# Patient Record
Sex: FEMALE | Race: WHITE
[De-identification: names, ages, dates, MRNs, and addresses within clinical notes are randomized per-mention and may not be internally consistent; named-entity substitution may affect disease eponyms.]

---

## 2017-05-12 ENCOUNTER — HOSPITAL ENCOUNTER (EMERGENCY)
Dept: HOSPITAL 14 - H.ER | Age: 52
Discharge: HOME | End: 2017-05-12
Payer: MEDICAID

## 2017-05-12 VITALS — SYSTOLIC BLOOD PRESSURE: 129 MMHG | DIASTOLIC BLOOD PRESSURE: 86 MMHG | TEMPERATURE: 98.1 F | HEART RATE: 69 BPM

## 2017-05-12 VITALS — BODY MASS INDEX: 32.4 KG/M2

## 2017-05-12 VITALS — OXYGEN SATURATION: 100 % | RESPIRATION RATE: 20 BRPM

## 2017-05-12 DIAGNOSIS — R07.81: Primary | ICD-10-CM

## 2017-05-12 DIAGNOSIS — R05: ICD-10-CM

## 2017-05-12 DIAGNOSIS — I10: ICD-10-CM

## 2017-05-12 LAB
BACTERIA #/AREA URNS HPF: (no result) /[HPF]
BILIRUB UR-MCNC: NEGATIVE MG/DL
COLOR UR: YELLOW
GLUCOSE UR STRIP-MCNC: (no result) MG/DL
KETONES UR STRIP-MCNC: NEGATIVE MG/DL
LEUKOCYTE ESTERASE UR-ACNC: (no result) LEU/UL
PH UR STRIP: 5 [PH] (ref 5–8)
PROT UR STRIP-MCNC: NEGATIVE MG/DL
RBC # UR STRIP: NEGATIVE /UL
RBC #/AREA URNS HPF: 5 /HPF (ref 0–3)
SP GR UR STRIP: 1.01 (ref 1–1.03)
UROBILINOGEN UR-MCNC: (no result) MG/DL (ref 0.2–1)
WBC #/AREA URNS HPF: < 1 /HPF (ref 0–5)

## 2018-02-13 ENCOUNTER — HOSPITAL ENCOUNTER (EMERGENCY)
Dept: HOSPITAL 14 - H.ER | Age: 53
Discharge: HOME | End: 2018-02-13
Payer: MEDICAID

## 2018-02-13 VITALS
TEMPERATURE: 99.9 F | HEART RATE: 82 BPM | SYSTOLIC BLOOD PRESSURE: 117 MMHG | DIASTOLIC BLOOD PRESSURE: 79 MMHG | RESPIRATION RATE: 18 BRPM | OXYGEN SATURATION: 100 %

## 2018-02-13 VITALS — BODY MASS INDEX: 32.4 KG/M2

## 2018-02-13 DIAGNOSIS — J11.1: Primary | ICD-10-CM

## 2018-02-13 DIAGNOSIS — I10: ICD-10-CM

## 2018-02-17 ENCOUNTER — HOSPITAL ENCOUNTER (EMERGENCY)
Dept: HOSPITAL 14 - H.ER | Age: 53
Discharge: HOME | End: 2018-02-17
Payer: MEDICAID

## 2018-02-17 VITALS
HEART RATE: 70 BPM | RESPIRATION RATE: 19 BRPM | SYSTOLIC BLOOD PRESSURE: 121 MMHG | OXYGEN SATURATION: 100 % | DIASTOLIC BLOOD PRESSURE: 77 MMHG | TEMPERATURE: 98.4 F

## 2018-02-17 VITALS — BODY MASS INDEX: 32.4 KG/M2

## 2018-02-17 DIAGNOSIS — M54.5: Primary | ICD-10-CM

## 2018-02-17 DIAGNOSIS — I10: ICD-10-CM

## 2018-02-17 PROCEDURE — 96372 THER/PROPH/DIAG INJ SC/IM: CPT

## 2018-02-17 PROCEDURE — 99282 EMERGENCY DEPT VISIT SF MDM: CPT

## 2018-06-25 ENCOUNTER — HOSPITAL ENCOUNTER (OUTPATIENT)
Dept: HOSPITAL 14 - H.OPSURG | Age: 53
Discharge: HOME | End: 2018-06-25
Attending: FAMILY MEDICINE
Payer: SELF-PAY

## 2018-06-25 VITALS
SYSTOLIC BLOOD PRESSURE: 143 MMHG | TEMPERATURE: 97.9 F | HEART RATE: 59 BPM | DIASTOLIC BLOOD PRESSURE: 93 MMHG | OXYGEN SATURATION: 99 %

## 2018-06-25 VITALS — BODY MASS INDEX: 30.9 KG/M2

## 2018-06-25 VITALS — RESPIRATION RATE: 20 BRPM

## 2018-06-25 DIAGNOSIS — E04.9: Primary | ICD-10-CM

## 2018-08-24 ENCOUNTER — HOSPITAL ENCOUNTER (EMERGENCY)
Dept: HOSPITAL 14 - H.ER | Age: 53
Discharge: HOME | End: 2018-08-24
Payer: SELF-PAY

## 2018-08-24 VITALS
DIASTOLIC BLOOD PRESSURE: 85 MMHG | HEART RATE: 62 BPM | SYSTOLIC BLOOD PRESSURE: 123 MMHG | RESPIRATION RATE: 16 BRPM | TEMPERATURE: 98.2 F | OXYGEN SATURATION: 96 %

## 2018-08-24 VITALS — BODY MASS INDEX: 30.9 KG/M2

## 2018-08-24 DIAGNOSIS — M54.32: Primary | ICD-10-CM

## 2018-08-24 DIAGNOSIS — I10: ICD-10-CM

## 2018-08-27 ENCOUNTER — HOSPITAL ENCOUNTER (EMERGENCY)
Dept: HOSPITAL 31 - C.ER | Age: 53
Discharge: HOME | End: 2018-08-27
Payer: MEDICAID

## 2018-08-27 VITALS — BODY MASS INDEX: 30.9 KG/M2

## 2018-08-27 VITALS — HEART RATE: 55 BPM | TEMPERATURE: 97.6 F | SYSTOLIC BLOOD PRESSURE: 125 MMHG | DIASTOLIC BLOOD PRESSURE: 86 MMHG

## 2018-08-27 VITALS — RESPIRATION RATE: 18 BRPM | OXYGEN SATURATION: 99 %

## 2018-08-27 DIAGNOSIS — M54.5: Primary | ICD-10-CM

## 2018-08-27 LAB
BILIRUB UR-MCNC: NEGATIVE MG/DL
GLUCOSE UR STRIP-MCNC: NORMAL MG/DL
LEUKOCYTE ESTERASE UR-ACNC: (no result) LEU/UL
PH UR STRIP: 5 [PH] (ref 5–8)
PROT UR STRIP-MCNC: NEGATIVE MG/DL
RBC # UR STRIP: (no result) /UL
SP GR UR STRIP: 1.02 (ref 1–1.03)
SQUAMOUS EPITHIAL: 1 /HPF (ref 0–5)
UROBILINOGEN UR-MCNC: NORMAL MG/DL (ref 0.2–1)

## 2018-08-27 PROCEDURE — 72100 X-RAY EXAM L-S SPINE 2/3 VWS: CPT

## 2018-08-27 PROCEDURE — 87086 URINE CULTURE/COLONY COUNT: CPT

## 2018-08-27 PROCEDURE — 96372 THER/PROPH/DIAG INJ SC/IM: CPT

## 2018-08-27 PROCEDURE — 81001 URINALYSIS AUTO W/SCOPE: CPT

## 2018-08-27 PROCEDURE — 99284 EMERGENCY DEPT VISIT MOD MDM: CPT

## 2018-08-27 NOTE — C.PDOC
History Of Present Illness


54 y/o female presents to ED with c/o left lower back pain for 1 week radiating 

to back of left leg and knee. Patient reports dysuria and fever for "several 

days" and denies taking pain medication, hematuria, vomiting, leg numbness or 

any other complaints at this time. 


Time Seen by Provider: 08/27/18 09:05


Chief Complaint (Nursing): Back Pain


History Per: Patient


History/Exam Limitations: no limitations


Onset/Duration Of Symptoms: Days


Current Symptoms Are (Timing): Still Present


Quality Of Discomfort: "Pain"





Past Medical History


Reviewed: Historical Data, Nursing Documentation, Vital Signs


Vital Signs: 


 Last Vital Signs











Temp  97.6 F   08/27/18 10:27


 


Pulse  55 L  08/27/18 10:27


 


Resp  18   08/27/18 10:27


 


BP  125/86   08/27/18 10:27


 


Pulse Ox  99   08/27/18 11:46














- Medical History


PMH: Gastritis, HTN


Surgical History: Endoscopy





- [facility] Procedures








ESOPHAGOGASTRODUODENOSCOPY [EGD] W/CLOSED BIOPSY (09/17/15)


INJECT/INFUSE NEC (03/06/15)








Family History: States: No Known Family Hx





- Social History


Hx Tobacco Use: No


Hx Alcohol Use: No


Hx Substance Use: No





Review Of Systems


Constitutional: Positive for: Fever.  Negative for: Chills


Gastrointestinal: Negative for: Vomiting


Genitourinary: Positive for: Dysuria.  Negative for: Hematuria, Vaginal 

Discharge, Vaginal Bleeding


Musculoskeletal: Positive for: Back Pain


Skin: Negative for: Rash





Physical Exam





- Physical Exam


Appears: Non-toxic, No Acute Distress


Skin: Warm, Dry, No Rash


Head: Atraumatic, Normacephalic


Eye(s): bilateral: Normal Inspection


Oral Mucosa: Moist


Gastrointestinal/Abdominal: Soft, No Tenderness, No Guarding, No Rebound


Back: No CVA Tenderness, Other (left lower back tenderness)


Neurological/Psych: Oriented x3, Normal Speech, Normal Motor, Normal Sensation





ED Course And Treatment


O2 Sat by Pulse Oximetry: 99 (RA)


Pulse Ox Interpretation: Normal





Disposition





- Disposition


Referrals: 


[facility] Freddie Rearuna, [Non-Staff] - 


Disposition: HOME/ ROUTINE


Disposition Time: 10:15


Condition: GOOD


Additional Instructions: 





MAYUR ZELAYA, thank you for letting us take care of you today. The 

emergency medical care you received today was directed at your acute symptoms. 

If you were prescribed any medication, please fill it and take as directed. It 

may take several days for your symptoms to resolve. Return to the Emergency 

Department if your symptoms worsen, do not improve, or if you have any other 

problems.





Please contact your doctor or call one of the physicians/clinics you have been 

referred to that are listed on the Patient Visit Information form that is 

included in your discharge packet. Bring any paperwork you were given at 

discharge with you along with any medications you are taking to your follow up 

visit. Our treatment cannot replace ongoing medical care by a primary care 

provider outside of the emergency department.





Thank you for allowing the [facility] team to be part of your care today.

















Follow up with your primary care doctor in 2-3 days for re-evaluation and 

further management.











MAYUR ZELAYA, marcellus por dejarnos atenderlo alin. La atencin mdica de 

emergencia que recibi hoy estaba dirigida a madisyn sntomas agudos. Si le 

prescribieron algn medicamento, llnelo y tome segn las indicaciones. Madisyn s

ntomas pueden tardar varios asher en resolverse. Regrese al Departamento de 

Emergencia si madisyn sntomas empeoran, no mejoran o si tiene algn otro problema.





Comunquese con nava mdico o llame a cristino de los mdicos / clnicas a los que ha 

sido referido que figura en el formulario de Informacin de visita del paciente 

que se incluye en nava paquete de priscilla. Traiga todos los documentos que recibi 

al momento del priscilla junto con los medicamentos que est tomando en nava visita de 

seguimiento. Nuestro tratamiento no puede reemplazar la atencin mdica en 

curso por un proveedor de atencin primaria fuera del departamento de 

emergencia.





Marcellus por permitir que el equipo de [facility] sea parte de nava cuidado 

hoy.

















Tiffanie un seguimiento con nava mdico de atencin primaria en 2-3 asher para zac 

nueva evaluacin y administracin adicional.


Prescriptions: 


Cyclobenzaprine [Cyclobenzaprine HCl] 10 mg PO Q8 PRN #20 tab


 PRN Reason: Muscle Spasm


Ibuprofen [Motrin] 600 mg PO Q6 PRN #20 tab


 PRN Reason: Pain, Moderate (4-7)


Instructions:  Sciatica, Low Back Pain  (DC)


Forms:  Gen Discharge Inst Divehi, [facility] (Divehi)


Print Language: Romanian





- Clinical Impression


Clinical Impression: 


 Low back pain








- Scribe Statement


The provider has reviewed the documentation as recorded by the Jarredibdwaine Jameson





All medical record entries made by the Jarredibdwaine were at my direction and 

personally dictated by me. I have reviewed the chart and agree that the record 

accurately reflects my personal performance of the history, physical exam, 

medical decision making, and the department course for this patient. I have 

also personally directed, reviewed, and agree with the discharge instructions 

and disposition.

## 2018-08-27 NOTE — RAD
Date of service: 08/27/2018



PROCEDURE:  Radiographs of the Lumbar Spine.



HISTORY:

r/o fx







COMPARISON:

No prior.



FINDINGS:



BONES:

Normal alignment. No listhesis. No fracture.



DISC SPACES:

Unremarkable.



OTHER FINDINGS:

None.



IMPRESSION:

Unremarkable radiographs of the lumbar spine.

## 2018-10-10 ENCOUNTER — HOSPITAL ENCOUNTER (EMERGENCY)
Dept: HOSPITAL 31 - C.ER | Age: 53
Discharge: HOME | End: 2018-10-10
Payer: SELF-PAY

## 2018-10-10 VITALS — SYSTOLIC BLOOD PRESSURE: 160 MMHG | TEMPERATURE: 97.8 F | DIASTOLIC BLOOD PRESSURE: 111 MMHG | HEART RATE: 57 BPM

## 2018-10-10 VITALS — OXYGEN SATURATION: 98 %

## 2018-10-10 VITALS — RESPIRATION RATE: 18 BRPM

## 2018-10-10 VITALS — BODY MASS INDEX: 30.9 KG/M2

## 2018-10-10 DIAGNOSIS — S96.911A: ICD-10-CM
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
## 2018-10-10 DIAGNOSIS — S93.601A: Primary | ICD-10-CM
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
## 2018-10-10 NOTE — C.PDOC
Time Seen by Provider: 10/10/18 07:23


Chief Complaint (Nursing): Lower Extremity Problem/Injury





Past Medical History


Vital Signs: 





                                Last Vital Signs











Temp  98.4 F   10/10/18 07:20


 


Pulse  60   10/10/18 07:20


 


Resp  18   10/10/18 07:20


 


BP      


 


Pulse Ox  98   10/10/18 07:20














- Medical History


PMH: Gastritis, HTN


   Denies: Chronic Kidney Disease


Surgical History: Endoscopy





- [facility] Procedures











ESOPHAGOGASTRODUODENOSCOPY [EGD] W/CLOSED BIOPSY (09/17/15)


INJECT/INFUSE NEC (03/06/15)








Family History: States: Unknown Family Hx





- Social History


Hx Tobacco Use: No


Hx Alcohol Use: No


Hx Substance Use: No





- Immunization History


Hx Tetanus Toxoid Vaccination: No


Hx Influenza Vaccination: No


Hx Pneumococcal Vaccination: No





ED Course And Treatment


O2 Sat by Pulse Oximetry: 98





Disposition


Counseled Patient/Family Regarding: Studies Performed, Diagnosis, Need For 

Followup





- Disposition


Referrals: 


Podiatry Clinic [Outside]


[facility] [place] [Outside]


[facility] [Outside]


Disposition: HOME/ ROUTINE


Disposition Time: 08:30


Condition: STABLE


Additional Instructions: 


Follow up at clinic for further evaluation


Prescriptions: 


Ibuprofen [Motrin Tab] 400 mg PO TID PRN #12 tab


 PRN Reason: Pain


Instructions:  Foot Sprain (DC)


Print Language: Telugu





- POA


Present On Arrival: None





- Clinical Impression


Clinical Impression: 


 Sprain and strain

## 2019-02-05 ENCOUNTER — HOSPITAL ENCOUNTER (EMERGENCY)
Dept: HOSPITAL 14 - H.ER | Age: 54
Discharge: HOME | End: 2019-02-05
Payer: SELF-PAY

## 2019-02-05 VITALS — BODY MASS INDEX: 30.9 KG/M2

## 2019-02-05 VITALS
RESPIRATION RATE: 19 BRPM | DIASTOLIC BLOOD PRESSURE: 90 MMHG | SYSTOLIC BLOOD PRESSURE: 145 MMHG | HEART RATE: 64 BPM | OXYGEN SATURATION: 100 % | TEMPERATURE: 99 F

## 2019-02-05 DIAGNOSIS — S16.1XXA: Primary | ICD-10-CM

## 2019-02-05 DIAGNOSIS — S39.012A: ICD-10-CM

## 2019-02-05 PROCEDURE — 96372 THER/PROPH/DIAG INJ SC/IM: CPT

## 2019-02-05 PROCEDURE — 99283 EMERGENCY DEPT VISIT LOW MDM: CPT

## 2019-04-19 ENCOUNTER — HOSPITAL ENCOUNTER (EMERGENCY)
Dept: HOSPITAL 14 - H.ER | Age: 54
Discharge: HOME | End: 2019-04-19
Payer: SELF-PAY

## 2019-04-19 VITALS
TEMPERATURE: 98.2 F | HEART RATE: 76 BPM | SYSTOLIC BLOOD PRESSURE: 130 MMHG | DIASTOLIC BLOOD PRESSURE: 60 MMHG | OXYGEN SATURATION: 100 %

## 2019-04-19 VITALS — RESPIRATION RATE: 17 BRPM

## 2019-04-19 VITALS — BODY MASS INDEX: 30.9 KG/M2

## 2019-04-19 DIAGNOSIS — K29.70: Primary | ICD-10-CM

## 2019-04-19 DIAGNOSIS — I10: ICD-10-CM

## 2019-04-19 DIAGNOSIS — Z79.899: ICD-10-CM

## 2019-04-19 LAB
ALBUMIN SERPL-MCNC: 4.3 G/DL (ref 3.5–5)
ALBUMIN/GLOB SERPL: 1.4 {RATIO} (ref 1–2.1)
ALT SERPL-CCNC: 35 U/L (ref 9–52)
AST SERPL-CCNC: 28 U/L (ref 14–36)
BASOPHILS # BLD AUTO: 0 K/UL (ref 0–0.2)
BASOPHILS NFR BLD: 0.6 % (ref 0–2)
BUN SERPL-MCNC: 20 MG/DL (ref 7–17)
CALCIUM SERPL-MCNC: 9.6 MG/DL (ref 8.4–10.2)
EOSINOPHIL # BLD AUTO: 0.2 K/UL (ref 0–0.7)
EOSINOPHIL NFR BLD: 3.1 % (ref 0–4)
ERYTHROCYTE [DISTWIDTH] IN BLOOD BY AUTOMATED COUNT: 12.9 % (ref 11.5–14.5)
GFR NON-AFRICAN AMERICAN: > 60
HGB BLD-MCNC: 13.9 G/DL (ref 12–16)
LYMPHOCYTES # BLD AUTO: 2.8 K/UL (ref 1–4.3)
LYMPHOCYTES NFR BLD AUTO: 38.8 % (ref 20–40)
MCH RBC QN AUTO: 31 PG (ref 27–31)
MCHC RBC AUTO-ENTMCNC: 33.6 G/DL (ref 33–37)
MCV RBC AUTO: 92.4 FL (ref 81–99)
MONOCYTES # BLD: 0.8 K/UL (ref 0–0.8)
MONOCYTES NFR BLD: 10.4 % (ref 0–10)
NEUTROPHILS # BLD: 3.4 K/UL (ref 1.8–7)
NEUTROPHILS NFR BLD AUTO: 47.1 % (ref 50–75)
NRBC BLD AUTO-RTO: 0.1 % (ref 0–0)
PLATELET # BLD: 175 K/UL (ref 130–400)
PMV BLD AUTO: 9.2 FL (ref 7.2–11.7)
RBC # BLD AUTO: 4.5 MIL/UL (ref 3.8–5.2)
WBC # BLD AUTO: 7.2 K/UL (ref 4.8–10.8)

## 2019-05-01 ENCOUNTER — HOSPITAL ENCOUNTER (EMERGENCY)
Dept: HOSPITAL 14 - H.ER | Age: 54
Discharge: HOME | End: 2019-05-01
Payer: COMMERCIAL

## 2019-05-01 VITALS
DIASTOLIC BLOOD PRESSURE: 78 MMHG | RESPIRATION RATE: 16 BRPM | OXYGEN SATURATION: 98 % | HEART RATE: 70 BPM | TEMPERATURE: 97.9 F | SYSTOLIC BLOOD PRESSURE: 129 MMHG

## 2019-05-01 VITALS — BODY MASS INDEX: 30.9 KG/M2

## 2019-05-01 DIAGNOSIS — E03.9: ICD-10-CM

## 2019-05-01 DIAGNOSIS — H93.3X9: ICD-10-CM

## 2019-05-01 DIAGNOSIS — I10: ICD-10-CM

## 2019-05-01 DIAGNOSIS — R42: ICD-10-CM

## 2019-05-01 DIAGNOSIS — R51: Primary | ICD-10-CM

## 2019-05-01 DIAGNOSIS — H81.399: ICD-10-CM

## 2019-05-01 LAB
BASOPHILS # BLD AUTO: 0.1 K/UL (ref 0–0.2)
BASOPHILS NFR BLD: 0.6 % (ref 0–2)
BUN SERPL-MCNC: 14 MG/DL (ref 7–17)
CALCIUM SERPL-MCNC: 9.7 MG/DL (ref 8.4–10.2)
EOSINOPHIL # BLD AUTO: 0.2 K/UL (ref 0–0.7)
EOSINOPHIL NFR BLD: 2.1 % (ref 0–4)
ERYTHROCYTE [DISTWIDTH] IN BLOOD BY AUTOMATED COUNT: 12.8 % (ref 11.5–14.5)
ERYTHROCYTE [SEDIMENTATION RATE] IN BLOOD: 15 MM/HR (ref 0–30)
GFR NON-AFRICAN AMERICAN: > 60
HGB BLD-MCNC: 14.1 G/DL (ref 12–16)
LYMPHOCYTES # BLD AUTO: 3.3 K/UL (ref 1–4.3)
LYMPHOCYTES NFR BLD AUTO: 37.6 % (ref 20–40)
MCH RBC QN AUTO: 31.3 PG (ref 27–31)
MCHC RBC AUTO-ENTMCNC: 33.7 G/DL (ref 33–37)
MCV RBC AUTO: 93 FL (ref 81–99)
MONOCYTES # BLD: 0.7 K/UL (ref 0–0.8)
MONOCYTES NFR BLD: 8.3 % (ref 0–10)
NEUTROPHILS # BLD: 4.5 K/UL (ref 1.8–7)
NEUTROPHILS NFR BLD AUTO: 51.4 % (ref 50–75)
NRBC BLD AUTO-RTO: 0.1 % (ref 0–0)
PLATELET # BLD: 197 K/UL (ref 130–400)
PMV BLD AUTO: 10.1 FL (ref 7.2–11.7)
RBC # BLD AUTO: 4.5 MIL/UL (ref 3.8–5.2)
WBC # BLD AUTO: 8.8 K/UL (ref 4.8–10.8)

## 2019-05-01 PROCEDURE — 99285 EMERGENCY DEPT VISIT HI MDM: CPT

## 2019-05-01 PROCEDURE — 80048 BASIC METABOLIC PNL TOTAL CA: CPT

## 2019-05-01 PROCEDURE — 85025 COMPLETE CBC W/AUTO DIFF WBC: CPT

## 2019-05-01 PROCEDURE — 96365 THER/PROPH/DIAG IV INF INIT: CPT

## 2019-05-01 PROCEDURE — 96375 TX/PRO/DX INJ NEW DRUG ADDON: CPT

## 2019-05-01 PROCEDURE — 81025 URINE PREGNANCY TEST: CPT

## 2019-05-01 PROCEDURE — 70450 CT HEAD/BRAIN W/O DYE: CPT

## 2019-05-01 PROCEDURE — 85651 RBC SED RATE NONAUTOMATED: CPT

## 2019-05-01 PROCEDURE — 96361 HYDRATE IV INFUSION ADD-ON: CPT

## 2022-05-27 ENCOUNTER — NEW PATIENT (OUTPATIENT)
Dept: URBAN - METROPOLITAN AREA CLINIC 73 | Facility: CLINIC | Age: 57
End: 2022-05-27

## 2022-05-27 DIAGNOSIS — H53.031: ICD-10-CM

## 2022-05-27 DIAGNOSIS — H02.831: ICD-10-CM

## 2022-05-27 DIAGNOSIS — H43.393: ICD-10-CM

## 2022-05-27 DIAGNOSIS — H44.23: ICD-10-CM

## 2022-05-27 DIAGNOSIS — H44.2D1: ICD-10-CM

## 2022-05-27 PROCEDURE — 92202 OPSCPY EXTND ON/MAC DRAW: CPT

## 2022-05-27 PROCEDURE — 99204 OFFICE O/P NEW MOD 45 MIN: CPT

## 2022-05-27 PROCEDURE — 92250 FUNDUS PHOTOGRAPHY W/I&R: CPT

## 2022-05-27 PROCEDURE — 92134 CPTRZ OPH DX IMG PST SGM RTA: CPT

## 2022-05-27 ASSESSMENT — VISUAL ACUITY
OS_SC: 20/25-3
OD_SC: 20/80-1
OD_PH: 20/50

## 2022-05-27 ASSESSMENT — TONOMETRY
OS_IOP_MMHG: 12
OD_IOP_MMHG: 10

## 2023-06-09 ENCOUNTER — CONSULT EP (OUTPATIENT)
Dept: URBAN - METROPOLITAN AREA CLINIC 73 | Facility: CLINIC | Age: 58
End: 2023-06-09

## 2023-06-09 DIAGNOSIS — H44.2D1: ICD-10-CM

## 2023-06-09 DIAGNOSIS — H44.23: ICD-10-CM

## 2023-06-09 PROCEDURE — 92014 COMPRE OPH EXAM EST PT 1/>: CPT

## 2023-06-09 PROCEDURE — 92202 OPSCPY EXTND ON/MAC DRAW: CPT

## 2023-06-09 PROCEDURE — 92134 CPTRZ OPH DX IMG PST SGM RTA: CPT

## 2023-06-09 ASSESSMENT — VISUAL ACUITY
OD_PH: 20/60
OD_SC: 20/125
OS_SC: 20/50-2
OS_PH: 20/50

## 2023-06-30 ENCOUNTER — NEW PATIENT COMPREHENSIVE (OUTPATIENT)
Dept: URBAN - METROPOLITAN AREA CLINIC 110 | Facility: CLINIC | Age: 58
End: 2023-06-30

## 2023-06-30 DIAGNOSIS — H52.7: ICD-10-CM

## 2023-06-30 DIAGNOSIS — H43.393: ICD-10-CM

## 2023-06-30 DIAGNOSIS — H33.191: ICD-10-CM

## 2023-06-30 DIAGNOSIS — H02.401: ICD-10-CM

## 2023-06-30 DIAGNOSIS — H53.031: ICD-10-CM

## 2023-06-30 DIAGNOSIS — H25.13: ICD-10-CM

## 2023-06-30 DIAGNOSIS — H02.834: ICD-10-CM

## 2023-06-30 DIAGNOSIS — H02.835: ICD-10-CM

## 2023-06-30 DIAGNOSIS — H02.831: ICD-10-CM

## 2023-06-30 DIAGNOSIS — H02.832: ICD-10-CM

## 2023-06-30 PROCEDURE — 92015 DETERMINE REFRACTIVE STATE: CPT

## 2023-06-30 PROCEDURE — 92014 COMPRE OPH EXAM EST PT 1/>: CPT

## 2023-06-30 ASSESSMENT — TONOMETRY
OD_IOP_MMHG: 13
OS_IOP_MMHG: 12

## 2023-06-30 ASSESSMENT — VISUAL ACUITY
OU_CC: 20/25-3
OS_CC: 20/25-2
OS_PH: 20/25-2
OD_PH: 20/40-3
OU_CC: J1+
OD_CC: 20/40-3

## 2023-07-17 ENCOUNTER — CONSULT EP (OUTPATIENT)
Dept: URBAN - METROPOLITAN AREA CLINIC 110 | Facility: CLINIC | Age: 58
End: 2023-07-17

## 2023-07-17 DIAGNOSIS — H02.835: ICD-10-CM

## 2023-07-17 DIAGNOSIS — H57.813: ICD-10-CM

## 2023-07-17 DIAGNOSIS — H02.401: ICD-10-CM

## 2023-07-17 DIAGNOSIS — H02.832: ICD-10-CM

## 2023-07-17 DIAGNOSIS — H02.834: ICD-10-CM

## 2023-07-17 DIAGNOSIS — H02.831: ICD-10-CM

## 2023-07-17 PROCEDURE — 99214 OFFICE O/P EST MOD 30 MIN: CPT

## 2023-07-17 PROCEDURE — 92285 EXTERNAL OCULAR PHOTOGRAPHY: CPT

## 2023-07-17 ASSESSMENT — VISUAL ACUITY
OS_CC: 20/30
OD_CC: 20/60

## 2023-10-13 ENCOUNTER — TESTING ONLY (OUTPATIENT)
Dept: URBAN - METROPOLITAN AREA CLINIC 110 | Facility: CLINIC | Age: 58
End: 2023-10-13

## 2023-10-13 DIAGNOSIS — H02.401: ICD-10-CM

## 2023-10-13 DIAGNOSIS — H02.831: ICD-10-CM

## 2023-10-13 DIAGNOSIS — H02.834: ICD-10-CM

## 2023-10-13 PROCEDURE — 92082 INTERMEDIATE VISUAL FIELD XM: CPT

## 2023-10-16 ENCOUNTER — FOLLOW UP (OUTPATIENT)
Dept: URBAN - METROPOLITAN AREA CLINIC 110 | Facility: CLINIC | Age: 58
End: 2023-10-16

## 2023-10-16 DIAGNOSIS — H02.831: ICD-10-CM

## 2023-10-16 DIAGNOSIS — H02.834: ICD-10-CM

## 2023-10-16 DIAGNOSIS — H02.401: ICD-10-CM

## 2023-10-16 PROCEDURE — 92012 INTRM OPH EXAM EST PATIENT: CPT

## 2023-10-16 PROCEDURE — 92083 EXTENDED VISUAL FIELD XM: CPT

## 2023-10-16 ASSESSMENT — VISUAL ACUITY
OD_CC: 20/70-2
OS_CC: 20/40

## 2023-12-29 NOTE — RAD
Date of service: 



10/10/2018



PROCEDURE:  Right Foot Radiographs.



HISTORY:

 pain 



COMPARISON:

None.



FINDINGS:



BONES:

No fracture.  Plantar calcaneal spur noted. 



JOINTS:

Normal. 



SOFT TISSUES:

Normal. 



OTHER FINDINGS:

None.



IMPRESSION:

Plantar calcaneal spur.  Otherwise unremarkable. no no

## 2025-02-28 ENCOUNTER — FOLLOW UP (OUTPATIENT)
Age: 60
End: 2025-02-28

## 2025-02-28 DIAGNOSIS — H25.13: ICD-10-CM

## 2025-02-28 DIAGNOSIS — H35.033: ICD-10-CM

## 2025-02-28 DIAGNOSIS — H10.13: ICD-10-CM

## 2025-02-28 PROCEDURE — 92202 OPSCPY EXTND ON/MAC DRAW: CPT

## 2025-02-28 PROCEDURE — 92134 CPTRZ OPH DX IMG PST SGM RTA: CPT

## 2025-02-28 PROCEDURE — 92014 COMPRE OPH EXAM EST PT 1/>: CPT

## 2025-02-28 ASSESSMENT — TONOMETRY
OD_IOP_MMHG: 9
OS_IOP_MMHG: 11

## 2025-02-28 ASSESSMENT — VISUAL ACUITY
OD_CC: 20/80
OS_CC: 20/80-1